# Patient Record
Sex: MALE | Race: WHITE | Employment: OTHER | URBAN - METROPOLITAN AREA
[De-identification: names, ages, dates, MRNs, and addresses within clinical notes are randomized per-mention and may not be internally consistent; named-entity substitution may affect disease eponyms.]

---

## 2019-03-16 ENCOUNTER — HOSPITAL ENCOUNTER (EMERGENCY)
Facility: CLINIC | Age: 74
Discharge: HOME | End: 2019-03-16
Attending: EMERGENCY MEDICINE | Admitting: EMERGENCY MEDICINE
Payer: MEDICARE

## 2019-03-16 ENCOUNTER — NURSE TRIAGE (OUTPATIENT)
Dept: NURSING | Facility: CLINIC | Age: 74
End: 2019-03-16

## 2019-03-16 VITALS
RESPIRATION RATE: 16 BRPM | OXYGEN SATURATION: 96 % | HEART RATE: 100 BPM | HEIGHT: 73 IN | WEIGHT: 212 LBS | DIASTOLIC BLOOD PRESSURE: 103 MMHG | SYSTOLIC BLOOD PRESSURE: 144 MMHG | BODY MASS INDEX: 28.1 KG/M2 | TEMPERATURE: 98.2 F

## 2019-03-16 DIAGNOSIS — N41.1 CHRONIC PROSTATITIS: ICD-10-CM

## 2019-03-16 LAB
ALBUMIN SERPL-MCNC: 3.9 G/DL (ref 3.4–5)
ALP SERPL-CCNC: 62 U/L (ref 40–150)
ALT SERPL W P-5'-P-CCNC: 43 U/L (ref 0–70)
ANION GAP SERPL CALCULATED.3IONS-SCNC: 6 MMOL/L (ref 3–14)
AST SERPL W P-5'-P-CCNC: 30 U/L (ref 0–45)
BASOPHILS # BLD AUTO: 0 10E9/L (ref 0–0.2)
BASOPHILS NFR BLD AUTO: 0.3 %
BILIRUB SERPL-MCNC: 0.7 MG/DL (ref 0.2–1.3)
BUN SERPL-MCNC: 17 MG/DL (ref 7–30)
CALCIUM SERPL-MCNC: 8.6 MG/DL (ref 8.5–10.1)
CHLORIDE SERPL-SCNC: 110 MMOL/L (ref 94–109)
CO2 SERPL-SCNC: 26 MMOL/L (ref 20–32)
CREAT SERPL-MCNC: 1.13 MG/DL (ref 0.66–1.25)
DIFFERENTIAL METHOD BLD: NORMAL
EOSINOPHIL # BLD AUTO: 0 10E9/L (ref 0–0.7)
EOSINOPHIL NFR BLD AUTO: 0.5 %
ERYTHROCYTE [DISTWIDTH] IN BLOOD BY AUTOMATED COUNT: 14.1 % (ref 10–15)
GFR SERPL CREATININE-BSD FRML MDRD: 63 ML/MIN/{1.73_M2}
GLUCOSE SERPL-MCNC: 105 MG/DL (ref 70–99)
HCT VFR BLD AUTO: 42.8 % (ref 40–53)
HGB BLD-MCNC: 14.9 G/DL (ref 13.3–17.7)
IMM GRANULOCYTES # BLD: 0 10E9/L (ref 0–0.4)
IMM GRANULOCYTES NFR BLD: 0.3 %
LYMPHOCYTES # BLD AUTO: 1.2 10E9/L (ref 0.8–5.3)
LYMPHOCYTES NFR BLD AUTO: 19.2 %
MCH RBC QN AUTO: 31.8 PG (ref 26.5–33)
MCHC RBC AUTO-ENTMCNC: 34.8 G/DL (ref 31.5–36.5)
MCV RBC AUTO: 91 FL (ref 78–100)
MONOCYTES # BLD AUTO: 0.5 10E9/L (ref 0–1.3)
MONOCYTES NFR BLD AUTO: 7.7 %
NEUTROPHILS # BLD AUTO: 4.5 10E9/L (ref 1.6–8.3)
NEUTROPHILS NFR BLD AUTO: 72 %
PLATELET # BLD AUTO: 162 10E9/L (ref 150–450)
POTASSIUM SERPL-SCNC: 3.8 MMOL/L (ref 3.4–5.3)
PROT SERPL-MCNC: 7.2 G/DL (ref 6.8–8.8)
RBC # BLD AUTO: 4.69 10E12/L (ref 4.4–5.9)
SODIUM SERPL-SCNC: 142 MMOL/L (ref 133–144)
WBC # BLD AUTO: 6.2 10E9/L (ref 4–11)

## 2019-03-16 PROCEDURE — 80053 COMPREHEN METABOLIC PANEL: CPT | Performed by: EMERGENCY MEDICINE

## 2019-03-16 PROCEDURE — 25000132 ZZH RX MED GY IP 250 OP 250 PS 637: Mod: GY | Performed by: EMERGENCY MEDICINE

## 2019-03-16 PROCEDURE — 99283 EMERGENCY DEPT VISIT LOW MDM: CPT

## 2019-03-16 PROCEDURE — A9270 NON-COVERED ITEM OR SERVICE: HCPCS | Mod: GY | Performed by: EMERGENCY MEDICINE

## 2019-03-16 PROCEDURE — 85025 COMPLETE CBC W/AUTO DIFF WBC: CPT | Performed by: EMERGENCY MEDICINE

## 2019-03-16 RX ORDER — PREGABALIN 75 MG/1
75 CAPSULE ORAL 2 TIMES DAILY
Qty: 60 CAPSULE | Refills: 0 | Status: SHIPPED | OUTPATIENT
Start: 2019-03-16 | End: 2020-04-23

## 2019-03-16 RX ORDER — ACETAMINOPHEN 325 MG/1
650 TABLET ORAL ONCE
Status: COMPLETED | OUTPATIENT
Start: 2019-03-16 | End: 2019-03-16

## 2019-03-16 RX ORDER — TAMSULOSIN HYDROCHLORIDE 0.4 MG/1
1 CAPSULE ORAL EVERY 24 HOURS
COMMUNITY
Start: 2007-03-28

## 2019-03-16 RX ORDER — LEVOTHYROXINE SODIUM 100 UG/1
100 TABLET ORAL DAILY
COMMUNITY
Start: 2018-05-02

## 2019-03-16 RX ORDER — SIMVASTATIN 20 MG
20 TABLET ORAL DAILY
Status: ON HOLD | COMMUNITY
End: 2019-04-24

## 2019-03-16 RX ORDER — SULFAMETHOXAZOLE/TRIMETHOPRIM 800-160 MG
1 TABLET ORAL 2 TIMES DAILY
Status: ON HOLD | COMMUNITY
Start: 2019-03-14 | End: 2019-04-24

## 2019-03-16 RX ADMIN — ACETAMINOPHEN 650 MG: 325 TABLET, FILM COATED ORAL at 12:16

## 2019-03-16 ASSESSMENT — ENCOUNTER SYMPTOMS
VOMITING: 0
RECTAL PAIN: 0
FEVER: 0
CONSTIPATION: 0
FLANK PAIN: 1
ABDOMINAL PAIN: 1
DIARRHEA: 0
FREQUENCY: 0
BLOOD IN STOOL: 0
CHILLS: 1
DYSURIA: 0
NAUSEA: 1

## 2019-03-16 ASSESSMENT — MIFFLIN-ST. JEOR: SCORE: 1750.51

## 2019-03-16 NOTE — ED PROVIDER NOTES
History     Chief Complaint:  Abdominal pain     HPI   Adrián Richardson is a 74 year old male with a history of chronic prostatitis and BPH who presents with abdominal pain.  The patient reports ongoing issues with his prostate which initially started in December of last year.  He was treated with a course of Bactrim and then in January was switched over to Cipro after he continued to have symptoms.  His typical symptoms are left-sided lower abdominal pain radiating to his left flank.  He had a CT scan in mid-February of this year which showed a possible small abscess therefore he was switched over to Clindamycin by his urologist, Dr. Whittaker.  His symptoms did improve somewhat but then worsened again.  He saw ID 2 weeks ago who recommended stopping antibiotics as he did not have any evidence of current infection.  Cultures were done and were negative.  In the past 4 - 5 days, his left-sided abdominal pain has worsened and he is having convulsions of pain starting in his prostate area with associated rigors and flushing.  He was seen in the ED at [facility] 2 days ago.  Urinalysis showed trace leukocyte esterase but was otherwise unremarkable.  He was started on Bactrim empirically as this has helped his symptoms in the past.  He continues to have pain, prompting his presentation here today.  He denies any fevers, pain with urination, frequency, urgency, or hesitancy.  He has no rectal pain and is having normal bowel movements.  He did have some nausea last night but he denies any vomiting or diarrhea.  He has taken a total of 5 Advil, in divided doses, since 0000 today.  He has an appointment to see Dr. Whittaker in 3 days time.      Allergies:  Levofloxacin      Medications:    Levothyroxine   Bactrim  Tamsulosin  Simvastatin     Past Medical History:    Benign prostatic hypertrophy   Gastroesophageal reflux disease   Hiatal hernia   Diverticulosis  Psoriasis   Hyperlipidemia  Caroline-Parkinson-White syndrome  "  Degenerative disc disease   Hypothyroidism     Past Surgical History:    Nasal sinus surgery, left 2012  Inguinal hernia repair, bilateral 2003  Knee arthroscopy, left   Hand carpectomy, right   Cataract removal    Family History:    Breast cancer - mother  Colon cancer - father  Myocardial infarction - brother     Social History:  Presents to the ED alone.  Tobacco Use: No previous or current tobacco use.   Alcohol Use: Occasional alcohol use.   PCP: [facility]     Review of Systems   Constitutional: Positive for chills. Negative for fever.   Gastrointestinal: Positive for abdominal pain and nausea. Negative for blood in stool, constipation, diarrhea, rectal pain and vomiting.   Genitourinary: Positive for flank pain. Negative for dysuria, frequency and urgency.   All other systems reviewed and are negative.      Physical Exam   First Vitals:  BP: 134/84  Pulse: 100  Heart Rate: 100  Temp: 98.2  F (36.8  C)  Resp: 16  Height: 185.4 cm (6' 1\")  Weight: 96.2 kg (212 lb)  SpO2: 96 %    Physical Exam  General: Well-nourished, appears to be resting comfortably when I enter the room  Eyes: PERRL, conjunctivae pink no scleral icterus or conjunctival injection  ENT:  Moist mucus membranes, posterior oropharynx clear without erythema or exudates  Respiratory:  Lungs clear to auscultation bilaterally, no crackles/rubs/wheezes.  Good air movement  CV: Normal rate and rhythm, no murmurs/rubs/gallops  GI:  Abdomen soft and non-distended.  Normoactive BS.  No tenderness, guarding or rebound.  No palpable hernia.  Skin: Warm, dry.  No rashes or petechiae  Musculoskeletal: No peripheral edema or calf tenderness  Neuro: Alert and oriented to person/place/time  Psychiatric: Tearful affect    Emergency Department Course     Laboratory:  CBC: WNL (WBC 6.2, HGB 14.9, )   CMP: Cl 110 (H), Glucose 105 (H), otherwise WNL (Creatinine 1.13)     Interventions:  (1216) Tylenol, 650 mg, PO     Emergency Department Course:  Blood " was drawn from the patient in triage. This was sent for laboratory testing, findings above.      Nursing notes and vitals reviewed.  I performed an exam of the patient as documented above.     (9573) I spoke with Dr. Rboledo, on call for the patient's primary urologist.      Findings and plan explained to the patient. Patient discharged home with instructions regarding supportive care, medications, and reasons to return. The importance of close follow-up was reviewed. The patient was prescribed Pregabalin.     Impression & Plan      Medical Decision Making:  Adrián Richardson is a 74 year old male who comes on his birthday today for concern of a flare of severe prostate pain.  He has had a difficult course of prostatitis over the past several months that has intermittently responded to antibiotics but has been fairly regular.  He was recently started on Bactrim after a negative urinalysis.  He has an appointment coming up with Dr. Whittakre, his urologist.  I reviewed the extensive paperwork he brought with him detailing his symptoms.  It seems that there may be some relation to hernia but I do not appreciate any tenderness or incarcerated hernia today.  He is not febrile and attributes the shaking that he had to severity of his pain.  He has an otherwise benign examination and  reassuring vital signs.  He has recently been evaluated.  He had extensive CT scans for the same problem.  I really do not feel that he would benefit from further laboratory or imaging testing today.  I discussed this with him at length.  I called the on-call urologist for advice and he felt that if the patient was vitally stable he could follow-up as an outpatient on Wednesday with Dr. Whittaker.  I did some research and it appears that for chronic prostatitis and pelvic pain there are some additional options.  I recommended that we try pregabalin  to see if this would provide some assistance.  There are also some herbal supplements which have  been shown to have some potential benefit.  I discussed this with him.  I also discussed with him the nature of chronic pain.  He does not want to be started on opiate medications.  I recommended to him that we have for refer him to a pain clinic to see what other modalities might be available to him and might benefit him.  He was open to this plan.  He does not seem to have underlying depression but this is certainly causing a great deal of strain for him.  Today is his birthday and he was unable to meet with his family and care for his grandson.  Is having a great impact on his life and I am hopeful that he can get some improvement in his symptoms with a coordinated approach from the pain clinic.  A referral was made in epic for him.  I also updated our care coordinator via a staff message and asked if she could assist him in obtaining an appointment that works with his insurance.  The patient was in agreement with this plan.  He is going to follow-up as directed and return if any worsening.    Diagnosis:    ICD-10-CM    1. Chronic prostatitis N41.1 PAIN MANAGEMENT REFERRAL     Disposition:  Discharged to home.     Discharge Medications:     Medication List      Started    pregabalin 75 MG capsule  Commonly known as:  LYRICA  75 mg, Oral, 2 TIMES DAILY            I, Eva Dean, am serving as a scribe on 3/16/2019 at 11:13 AM to personally document services performed by Dr. Pack based on my observations and the provider's statements to me.    Eva Dean  3/16/2019    EMERGENCY DEPARTMENT       Anjana Pack MD  03/17/19 2524

## 2019-03-16 NOTE — TELEPHONE ENCOUNTER
"Patient states he has prostate problems, has been diagnosed with prostate flare.  \"Extreme\" pain stated las evening at 7PM and since midnight, patient has taken 5 Advil with little to no relief.  States pain started in prostate and moved to abdomen.  Started Bactrim yesterday.  Denies fever.  Experiencing sweats, \"abdominal convulsions\", nausea.  Has appointment with Dr. Whittaker on 3/19/19.  Advised patient to be seen at  or ED per main clinic line out of office recording.  Patient will go to [facility].    Reason for Disposition    [1] SEVERE pain AND [2] age > 60    Additional Information    Negative: Shock suspected (e.g., cold/pale/clammy skin, too weak to stand, low BP, rapid pulse)    Negative: Difficult to awaken or acting confused  (e.g., disoriented, slurred speech)    Negative: Passed out (i.e., lost consciousness, collapsed and was not responding)    Negative: Sounds like a life-threatening emergency to the triager    Negative: Chest pain    Negative: Pain is mainly in upper abdomen  (if needed ask: \"is it mainly above the belly button?\")    Negative: Followed an abdomen (stomach) injury    Negative: [1] SEVERE pain (e.g., excruciating) AND [2] present > 1 hour    Protocols used: ABDOMINAL PAIN - MALE-ADULT-      "

## 2019-03-16 NOTE — DISCHARGE INSTRUCTIONS
*You may resume diet and activities as tolerated.  *Take medications as prescribed.  You can take ibuprofen 600 mg by mouth up to 4 times daily.  You can also take tylenol 650mg by mouth 4 times daily.  Pregabalin as directed.  You can also try cerniton and quercetin which you might find at a vitamin/supplement store.  Continue your current medications.  *Follow-up with Dr. Whittaker as scheduled on Tuesday.  Recommend establishing care with a pain clinic.  I have made a referral to our care coordinator to help assist with this.   *Return if you develop fever over 100.4, faint or feel like you will faint or become worse in any way.

## 2019-03-16 NOTE — ED AVS SNAPSHOT
Emergency Department  [address] [place] [id number]  Phone:  [phone]  Fax:  [phone]                                    Adrián Richardson   MRN: [id number]    Department:   Emergency Department   Date of Visit:  3/16/2019           After Visit Summary Signature Page    I have received my discharge instructions, and my questions have been answered. I have discussed any challenges I see with this plan with the nurse or doctor.    ..........................................................................................................................................  Patient/Patient Representative Signature      ..........................................................................................................................................  Patient Representative Print Name and Relationship to Patient    ..................................................               ................................................  Date                                   Time    ..........................................................................................................................................  Reviewed by Signature/Title    ...................................................              ..............................................  Date                                               Time          22EPIC Rev 08/18

## 2019-04-23 RX ORDER — BETAMETHASONE DIPROPIONATE 0.5 MG/G
CREAM TOPICAL 2 TIMES DAILY
COMMUNITY

## 2019-04-23 RX ORDER — FLUOCINONIDE TOPICAL SOLUTION USP, 0.05% 0.5 MG/ML
SOLUTION TOPICAL
COMMUNITY

## 2019-04-23 RX ORDER — ATORVASTATIN CALCIUM 40 MG/1
40 TABLET, FILM COATED ORAL DAILY
COMMUNITY

## 2019-04-23 RX ORDER — FLUTICASONE PROPIONATE 50 MCG
1 SPRAY, SUSPENSION (ML) NASAL DAILY
COMMUNITY

## 2019-04-23 RX ORDER — DUTASTERIDE 0.5 MG/1
0.5 CAPSULE, LIQUID FILLED ORAL DAILY
COMMUNITY

## 2019-04-23 RX ORDER — HYDROCORTISONE VALERATE CREAM 2 MG/G
CREAM TOPICAL 2 TIMES DAILY
COMMUNITY

## 2019-04-23 RX ORDER — CHLORAL HYDRATE 500 MG
1 CAPSULE ORAL DAILY
COMMUNITY

## 2019-04-23 RX ORDER — CLOBETASOL PROPIONATE 0.5 MG/G
CREAM TOPICAL 2 TIMES DAILY
COMMUNITY

## 2019-04-23 RX ORDER — CHOLECALCIFEROL (VITAMIN D3) 50 MCG
1 TABLET ORAL DAILY
COMMUNITY

## 2019-04-24 ENCOUNTER — HOSPITAL ENCOUNTER (OUTPATIENT)
Facility: CLINIC | Age: 74
Discharge: HOME | End: 2019-04-25
Attending: UROLOGY | Admitting: UROLOGY
Payer: MEDICARE

## 2019-04-24 ENCOUNTER — ANESTHESIA (OUTPATIENT)
Dept: SURGERY | Facility: CLINIC | Age: 74
End: 2019-04-24
Payer: MEDICARE

## 2019-04-24 ENCOUNTER — ANESTHESIA EVENT (OUTPATIENT)
Dept: SURGERY | Facility: CLINIC | Age: 74
End: 2019-04-24
Payer: MEDICARE

## 2019-04-24 DIAGNOSIS — Z98.890 POST-OPERATIVE STATE: Primary | ICD-10-CM

## 2019-04-24 PROCEDURE — 25800030 ZZH RX IP 258 OP 636: Performed by: UROLOGY

## 2019-04-24 PROCEDURE — 25000128 H RX IP 250 OP 636: Performed by: ANESTHESIOLOGY

## 2019-04-24 PROCEDURE — 25800030 ZZH RX IP 258 OP 636: Performed by: NURSE ANESTHETIST, CERTIFIED REGISTERED

## 2019-04-24 PROCEDURE — 88305 TISSUE EXAM BY PATHOLOGIST: CPT | Performed by: UROLOGY

## 2019-04-24 PROCEDURE — 37000008 ZZH ANESTHESIA TECHNICAL FEE, 1ST 30 MIN: Performed by: UROLOGY

## 2019-04-24 PROCEDURE — A9270 NON-COVERED ITEM OR SERVICE: HCPCS | Performed by: UROLOGY

## 2019-04-24 PROCEDURE — 25000125 ZZHC RX 250: Performed by: UROLOGY

## 2019-04-24 PROCEDURE — A9270 NON-COVERED ITEM OR SERVICE: HCPCS | Mod: GY | Performed by: UROLOGY

## 2019-04-24 PROCEDURE — 36000058 ZZH SURGERY LEVEL 3 EA 15 ADDTL MIN: Performed by: UROLOGY

## 2019-04-24 PROCEDURE — 71000012 ZZH RECOVERY PHASE 1 LEVEL 1 FIRST HR: Performed by: UROLOGY

## 2019-04-24 PROCEDURE — 25000132 ZZH RX MED GY IP 250 OP 250 PS 637: Mod: GY | Performed by: UROLOGY

## 2019-04-24 PROCEDURE — 25000128 H RX IP 250 OP 636: Performed by: NURSE ANESTHETIST, CERTIFIED REGISTERED

## 2019-04-24 PROCEDURE — 25000566 ZZH SEVOFLURANE, EA 15 MIN: Performed by: UROLOGY

## 2019-04-24 PROCEDURE — 36000056 ZZH SURGERY LEVEL 3 1ST 30 MIN: Performed by: UROLOGY

## 2019-04-24 PROCEDURE — 25000128 H RX IP 250 OP 636: Performed by: UROLOGY

## 2019-04-24 PROCEDURE — 25800025 ZZH RX 258: Performed by: UROLOGY

## 2019-04-24 PROCEDURE — 27210794 ZZH OR GENERAL SUPPLY STERILE: Performed by: UROLOGY

## 2019-04-24 PROCEDURE — 37000009 ZZH ANESTHESIA TECHNICAL FEE, EACH ADDTL 15 MIN: Performed by: UROLOGY

## 2019-04-24 PROCEDURE — 40000170 ZZH STATISTIC PRE-PROCEDURE ASSESSMENT II: Performed by: UROLOGY

## 2019-04-24 PROCEDURE — 88305 TISSUE EXAM BY PATHOLOGIST: CPT | Mod: 26 | Performed by: UROLOGY

## 2019-04-24 PROCEDURE — 25800030 ZZH RX IP 258 OP 636: Performed by: ANESTHESIOLOGY

## 2019-04-24 PROCEDURE — 25000125 ZZHC RX 250: Performed by: NURSE ANESTHETIST, CERTIFIED REGISTERED

## 2019-04-24 RX ORDER — SODIUM CHLORIDE, SODIUM LACTATE, POTASSIUM CHLORIDE, CALCIUM CHLORIDE 600; 310; 30; 20 MG/100ML; MG/100ML; MG/100ML; MG/100ML
INJECTION, SOLUTION INTRAVENOUS CONTINUOUS
Status: DISCONTINUED | OUTPATIENT
Start: 2019-04-24 | End: 2019-04-24

## 2019-04-24 RX ORDER — LEVOTHYROXINE SODIUM 100 UG/1
100 TABLET ORAL DAILY
Status: DISCONTINUED | OUTPATIENT
Start: 2019-04-24 | End: 2019-04-25 | Stop reason: HOSPADM

## 2019-04-24 RX ORDER — FENTANYL CITRATE 50 UG/ML
25-50 INJECTION, SOLUTION INTRAMUSCULAR; INTRAVENOUS EVERY 5 MIN PRN
Status: DISCONTINUED | OUTPATIENT
Start: 2019-04-24 | End: 2019-04-24 | Stop reason: ALTCHOICE

## 2019-04-24 RX ORDER — ATORVASTATIN CALCIUM 40 MG/1
40 TABLET, FILM COATED ORAL DAILY
Status: DISCONTINUED | OUTPATIENT
Start: 2019-04-24 | End: 2019-04-25 | Stop reason: HOSPADM

## 2019-04-24 RX ORDER — ATROPA BELLADONNA AND OPIUM 16.2; 3 MG/1; MG/1
SUPPOSITORY RECTAL PRN
Status: DISCONTINUED | OUTPATIENT
Start: 2019-04-24 | End: 2019-04-24 | Stop reason: HOSPADM

## 2019-04-24 RX ORDER — ONDANSETRON 2 MG/ML
4 INJECTION INTRAMUSCULAR; INTRAVENOUS EVERY 6 HOURS PRN
Status: DISCONTINUED | OUTPATIENT
Start: 2019-04-24 | End: 2019-04-25 | Stop reason: HOSPADM

## 2019-04-24 RX ORDER — PREGABALIN 75 MG/1
75 CAPSULE ORAL 2 TIMES DAILY
Status: DISCONTINUED | OUTPATIENT
Start: 2019-04-24 | End: 2019-04-25 | Stop reason: HOSPADM

## 2019-04-24 RX ORDER — PROPOFOL 10 MG/ML
INJECTION, EMULSION INTRAVENOUS PRN
Status: DISCONTINUED | OUTPATIENT
Start: 2019-04-24 | End: 2019-04-24

## 2019-04-24 RX ORDER — NEOSTIGMINE METHYLSULFATE 1 MG/ML
VIAL (ML) INJECTION PRN
Status: DISCONTINUED | OUTPATIENT
Start: 2019-04-24 | End: 2019-04-24

## 2019-04-24 RX ORDER — ONDANSETRON 2 MG/ML
4 INJECTION INTRAMUSCULAR; INTRAVENOUS EVERY 30 MIN PRN
Status: DISCONTINUED | OUTPATIENT
Start: 2019-04-24 | End: 2019-04-24 | Stop reason: DRUGHIGH

## 2019-04-24 RX ORDER — EPHEDRINE SULFATE 50 MG/ML
INJECTION, SOLUTION INTRAMUSCULAR; INTRAVENOUS; SUBCUTANEOUS PRN
Status: DISCONTINUED | OUTPATIENT
Start: 2019-04-24 | End: 2019-04-24

## 2019-04-24 RX ORDER — NALOXONE HYDROCHLORIDE 0.4 MG/ML
.1-.4 INJECTION, SOLUTION INTRAMUSCULAR; INTRAVENOUS; SUBCUTANEOUS
Status: DISCONTINUED | OUTPATIENT
Start: 2019-04-24 | End: 2019-04-24

## 2019-04-24 RX ORDER — DUTASTERIDE 0.5 MG/1
0.5 CAPSULE, LIQUID FILLED ORAL DAILY
Status: DISCONTINUED | OUTPATIENT
Start: 2019-04-24 | End: 2019-04-25 | Stop reason: HOSPADM

## 2019-04-24 RX ORDER — HYDROMORPHONE HYDROCHLORIDE 1 MG/ML
.3-.5 INJECTION, SOLUTION INTRAMUSCULAR; INTRAVENOUS; SUBCUTANEOUS EVERY 10 MIN PRN
Status: DISCONTINUED | OUTPATIENT
Start: 2019-04-24 | End: 2019-04-24 | Stop reason: ALTCHOICE

## 2019-04-24 RX ORDER — CEFAZOLIN SODIUM 2 G/100ML
2 INJECTION, SOLUTION INTRAVENOUS
Status: COMPLETED | OUTPATIENT
Start: 2019-04-24 | End: 2019-04-24

## 2019-04-24 RX ORDER — HYDROMORPHONE HYDROCHLORIDE 1 MG/ML
0.2 INJECTION, SOLUTION INTRAMUSCULAR; INTRAVENOUS; SUBCUTANEOUS
Status: DISCONTINUED | OUTPATIENT
Start: 2019-04-24 | End: 2019-04-25 | Stop reason: HOSPADM

## 2019-04-24 RX ORDER — SODIUM CHLORIDE, SODIUM LACTATE, POTASSIUM CHLORIDE, CALCIUM CHLORIDE 600; 310; 30; 20 MG/100ML; MG/100ML; MG/100ML; MG/100ML
INJECTION, SOLUTION INTRAVENOUS CONTINUOUS PRN
Status: DISCONTINUED | OUTPATIENT
Start: 2019-04-24 | End: 2019-04-24

## 2019-04-24 RX ORDER — OXYCODONE HYDROCHLORIDE 5 MG/1
5-10 TABLET ORAL
Status: DISCONTINUED | OUTPATIENT
Start: 2019-04-24 | End: 2019-04-25 | Stop reason: HOSPADM

## 2019-04-24 RX ORDER — ONDANSETRON 2 MG/ML
INJECTION INTRAMUSCULAR; INTRAVENOUS PRN
Status: DISCONTINUED | OUTPATIENT
Start: 2019-04-24 | End: 2019-04-24

## 2019-04-24 RX ORDER — OXYCODONE HYDROCHLORIDE 5 MG/1
5 TABLET ORAL
Status: DISCONTINUED | OUTPATIENT
Start: 2019-04-24 | End: 2019-04-24 | Stop reason: ALTCHOICE

## 2019-04-24 RX ORDER — FENTANYL CITRATE 50 UG/ML
INJECTION, SOLUTION INTRAMUSCULAR; INTRAVENOUS PRN
Status: DISCONTINUED | OUTPATIENT
Start: 2019-04-24 | End: 2019-04-24

## 2019-04-24 RX ORDER — SODIUM CHLORIDE, SODIUM LACTATE, POTASSIUM CHLORIDE, CALCIUM CHLORIDE 600; 310; 30; 20 MG/100ML; MG/100ML; MG/100ML; MG/100ML
INJECTION, SOLUTION INTRAVENOUS CONTINUOUS
Status: DISCONTINUED | OUTPATIENT
Start: 2019-04-24 | End: 2019-04-25

## 2019-04-24 RX ORDER — GLYCINE 1.5 G/100ML
IRRIGANT IRRIGATION PRN
Status: DISCONTINUED | OUTPATIENT
Start: 2019-04-24 | End: 2019-04-24 | Stop reason: HOSPADM

## 2019-04-24 RX ORDER — GLYCOPYRROLATE 0.2 MG/ML
INJECTION, SOLUTION INTRAMUSCULAR; INTRAVENOUS PRN
Status: DISCONTINUED | OUTPATIENT
Start: 2019-04-24 | End: 2019-04-24

## 2019-04-24 RX ORDER — TAMSULOSIN HYDROCHLORIDE 0.4 MG/1
0.4 CAPSULE ORAL EVERY 24 HOURS
Status: DISCONTINUED | OUTPATIENT
Start: 2019-04-24 | End: 2019-04-25 | Stop reason: HOSPADM

## 2019-04-24 RX ORDER — LIDOCAINE HYDROCHLORIDE 20 MG/ML
INJECTION, SOLUTION INFILTRATION; PERINEURAL PRN
Status: DISCONTINUED | OUTPATIENT
Start: 2019-04-24 | End: 2019-04-24

## 2019-04-24 RX ORDER — ONDANSETRON 4 MG/1
4 TABLET, ORALLY DISINTEGRATING ORAL EVERY 6 HOURS PRN
Status: DISCONTINUED | OUTPATIENT
Start: 2019-04-24 | End: 2019-04-25 | Stop reason: HOSPADM

## 2019-04-24 RX ORDER — ONDANSETRON 4 MG/1
4 TABLET, ORALLY DISINTEGRATING ORAL EVERY 30 MIN PRN
Status: DISCONTINUED | OUTPATIENT
Start: 2019-04-24 | End: 2019-04-24 | Stop reason: DRUGHIGH

## 2019-04-24 RX ORDER — ATROPA BELLADONNA AND OPIUM 16.2; 3 MG/1; MG/1
30 SUPPOSITORY RECTAL 3 TIMES DAILY PRN
Status: DISCONTINUED | OUTPATIENT
Start: 2019-04-24 | End: 2019-04-25 | Stop reason: HOSPADM

## 2019-04-24 RX ORDER — VECURONIUM BROMIDE 1 MG/ML
INJECTION, POWDER, LYOPHILIZED, FOR SOLUTION INTRAVENOUS PRN
Status: DISCONTINUED | OUTPATIENT
Start: 2019-04-24 | End: 2019-04-24

## 2019-04-24 RX ORDER — LIDOCAINE 40 MG/G
CREAM TOPICAL
Status: DISCONTINUED | OUTPATIENT
Start: 2019-04-24 | End: 2019-04-25 | Stop reason: HOSPADM

## 2019-04-24 RX ORDER — CEFAZOLIN SODIUM 1 G/3ML
1 INJECTION, POWDER, FOR SOLUTION INTRAMUSCULAR; INTRAVENOUS EVERY 8 HOURS
Status: COMPLETED | OUTPATIENT
Start: 2019-04-24 | End: 2019-04-24

## 2019-04-24 RX ORDER — DEXAMETHASONE SODIUM PHOSPHATE 4 MG/ML
INJECTION, SOLUTION INTRA-ARTICULAR; INTRALESIONAL; INTRAMUSCULAR; INTRAVENOUS; SOFT TISSUE PRN
Status: DISCONTINUED | OUTPATIENT
Start: 2019-04-24 | End: 2019-04-24

## 2019-04-24 RX ORDER — NALOXONE HYDROCHLORIDE 0.4 MG/ML
.1-.4 INJECTION, SOLUTION INTRAMUSCULAR; INTRAVENOUS; SUBCUTANEOUS
Status: DISCONTINUED | OUTPATIENT
Start: 2019-04-24 | End: 2019-04-25 | Stop reason: HOSPADM

## 2019-04-24 RX ADMIN — CEFAZOLIN SODIUM 2 G: 2 INJECTION, SOLUTION INTRAVENOUS at 07:39

## 2019-04-24 RX ADMIN — SODIUM CHLORIDE, POTASSIUM CHLORIDE, SODIUM LACTATE AND CALCIUM CHLORIDE: 600; 310; 30; 20 INJECTION, SOLUTION INTRAVENOUS at 17:55

## 2019-04-24 RX ADMIN — ONDANSETRON 4 MG: 2 INJECTION INTRAMUSCULAR; INTRAVENOUS at 08:37

## 2019-04-24 RX ADMIN — HYDROMORPHONE HYDROCHLORIDE 0.5 MG: 1 INJECTION, SOLUTION INTRAMUSCULAR; INTRAVENOUS; SUBCUTANEOUS at 09:19

## 2019-04-24 RX ADMIN — SODIUM CHLORIDE, POTASSIUM CHLORIDE, SODIUM LACTATE AND CALCIUM CHLORIDE: 600; 310; 30; 20 INJECTION, SOLUTION INTRAVENOUS at 08:33

## 2019-04-24 RX ADMIN — DEXMEDETOMIDINE HYDROCHLORIDE 8 MCG: 100 INJECTION, SOLUTION INTRAVENOUS at 08:17

## 2019-04-24 RX ADMIN — CEFAZOLIN 1 G: 1 INJECTION, POWDER, FOR SOLUTION INTRAMUSCULAR; INTRAVENOUS at 22:25

## 2019-04-24 RX ADMIN — LIDOCAINE HYDROCHLORIDE 80 MG: 20 INJECTION, SOLUTION INFILTRATION; PERINEURAL at 07:32

## 2019-04-24 RX ADMIN — FENTANYL CITRATE 50 MCG: 50 INJECTION, SOLUTION INTRAMUSCULAR; INTRAVENOUS at 07:45

## 2019-04-24 RX ADMIN — FENTANYL CITRATE 50 MCG: 50 INJECTION, SOLUTION INTRAMUSCULAR; INTRAVENOUS at 07:32

## 2019-04-24 RX ADMIN — CEFAZOLIN 1 G: 1 INJECTION, POWDER, FOR SOLUTION INTRAMUSCULAR; INTRAVENOUS at 15:03

## 2019-04-24 RX ADMIN — PROPOFOL 30 MG: 10 INJECTION, EMULSION INTRAVENOUS at 08:04

## 2019-04-24 RX ADMIN — SODIUM CHLORIDE, POTASSIUM CHLORIDE, SODIUM LACTATE AND CALCIUM CHLORIDE: 600; 310; 30; 20 INJECTION, SOLUTION INTRAVENOUS at 09:20

## 2019-04-24 RX ADMIN — NEOSTIGMINE METHYLSULFATE 5 MG: 1 INJECTION, SOLUTION INTRAVENOUS at 08:37

## 2019-04-24 RX ADMIN — GLYCOPYRROLATE 0.8 MG: 0.2 INJECTION, SOLUTION INTRAMUSCULAR; INTRAVENOUS at 08:37

## 2019-04-24 RX ADMIN — MIDAZOLAM 1 MG: 1 INJECTION INTRAMUSCULAR; INTRAVENOUS at 07:27

## 2019-04-24 RX ADMIN — VECURONIUM BROMIDE 1 MG: 1 INJECTION, POWDER, LYOPHILIZED, FOR SOLUTION INTRAVENOUS at 08:28

## 2019-04-24 RX ADMIN — TAMSULOSIN HYDROCHLORIDE 0.4 MG: 0.4 CAPSULE ORAL at 17:56

## 2019-04-24 RX ADMIN — PROPOFOL 150 MG: 10 INJECTION, EMULSION INTRAVENOUS at 07:32

## 2019-04-24 RX ADMIN — Medication 5 MG: at 08:34

## 2019-04-24 RX ADMIN — SODIUM CHLORIDE 6000 ML: 900 IRRIGANT IRRIGATION at 15:07

## 2019-04-24 RX ADMIN — SODIUM CHLORIDE 6000 ML: 900 IRRIGANT IRRIGATION at 19:19

## 2019-04-24 RX ADMIN — DUTASTERIDE 0.5 MG: 0.5 CAPSULE ORAL at 17:56

## 2019-04-24 RX ADMIN — SODIUM CHLORIDE, POTASSIUM CHLORIDE, SODIUM LACTATE AND CALCIUM CHLORIDE: 600; 310; 30; 20 INJECTION, SOLUTION INTRAVENOUS at 07:27

## 2019-04-24 RX ADMIN — ATORVASTATIN CALCIUM 40 MG: 40 TABLET, FILM COATED ORAL at 17:56

## 2019-04-24 RX ADMIN — ROCURONIUM BROMIDE 10 MG: 10 INJECTION INTRAVENOUS at 08:17

## 2019-04-24 RX ADMIN — ROCURONIUM BROMIDE 40 MG: 10 INJECTION INTRAVENOUS at 07:32

## 2019-04-24 RX ADMIN — OXYCODONE HYDROCHLORIDE 5 MG: 5 TABLET ORAL at 20:52

## 2019-04-24 RX ADMIN — DEXAMETHASONE SODIUM PHOSPHATE 4 MG: 4 INJECTION, SOLUTION INTRA-ARTICULAR; INTRALESIONAL; INTRAMUSCULAR; INTRAVENOUS; SOFT TISSUE at 07:38

## 2019-04-24 RX ADMIN — DEXMEDETOMIDINE HYDROCHLORIDE 8 MCG: 100 INJECTION, SOLUTION INTRAVENOUS at 08:04

## 2019-04-24 RX ADMIN — PHENYLEPHRINE HYDROCHLORIDE 50 MCG: 10 INJECTION, SOLUTION INTRAMUSCULAR; INTRAVENOUS; SUBCUTANEOUS at 08:34

## 2019-04-24 RX ADMIN — SODIUM CHLORIDE, POTASSIUM CHLORIDE, SODIUM LACTATE AND CALCIUM CHLORIDE: 600; 310; 30; 20 INJECTION, SOLUTION INTRAVENOUS at 10:30

## 2019-04-24 RX ADMIN — PROPOFOL 20 MG: 10 INJECTION, EMULSION INTRAVENOUS at 08:17

## 2019-04-24 RX ADMIN — PREGABALIN 75 MG: 75 CAPSULE ORAL at 20:10

## 2019-04-24 ASSESSMENT — COPD QUESTIONNAIRES: COPD: 0

## 2019-04-24 ASSESSMENT — MIFFLIN-ST. JEOR: SCORE: 1765.47

## 2019-04-24 ASSESSMENT — LIFESTYLE VARIABLES: TOBACCO_USE: 0

## 2019-04-24 NOTE — PLAN OF CARE
Tolerating fulls. Irrigation at moderate rate. Up with SBA. 2L oxygen, working on weaning. Capno refused. C/o discomfort but refuses interventions.

## 2019-04-24 NOTE — ANESTHESIA CARE TRANSFER NOTE
Patient: Adrián Richardson    Procedure(s):  CYSTOSCOPY, TRANSURETHRAL RESECTION (TUR) PROSTATE    Diagnosis: BENIGN PROSTATIC HYPERPLASIA  Diagnosis Additional Information: No value filed.    Anesthesia Type:   General, ETT     Note:  Airway :Face Mask  Patient transferred to:PACU  Comments: VSS. Airway and IV patent. Patient comfortable. Report to RN. Stable care transfer.  Handoff Report: Identifed the Patient, Identified the Reponsible Provider, Reviewed the pertinent medical history, Discussed the surgical course, Reviewed Intra-OP anesthesia mangement and issues during anesthesia, Set expectations for post-procedure period and Allowed opportunity for questions and acknowledgement of understanding      Vitals: (Last set prior to Anesthesia Care Transfer)    CRNA VITALS  4/24/2019 0815 - 4/24/2019 0851      4/24/2019             Pulse:  87    SpO2:  94 %    Resp Rate (set):  10                Electronically Signed By: BELKIS Donis CRNA  April 24, 2019  8:51 AM

## 2019-04-24 NOTE — ANESTHESIA POSTPROCEDURE EVALUATION
Patient: Adrián Richardson    Procedure(s):  CYSTOSCOPY, TRANSURETHRAL RESECTION (TUR) PROSTATE    Diagnosis:BENIGN PROSTATIC HYPERPLASIA  Diagnosis Additional Information: No value filed.    Anesthesia Type:  General, ETT    Note:  Anesthesia Post Evaluation    Patient location during evaluation: PACU  Patient participation: Able to fully participate in evaluation  Level of consciousness: awake and alert  Pain management: adequate  Airway patency: patent  Cardiovascular status: acceptable  Respiratory status: acceptable  Hydration status: acceptable  PONV: none     Anesthetic complications: None          Last vitals:  Vitals:    04/24/19 0945 04/24/19 0949 04/24/19 1020   BP:   120/71   Pulse:      Resp: 11  16   Temp: 36.2  C (97.2  F)  36.1  C (97  F)   SpO2: 94% 94%          Electronically Signed By: Yury Pressley MD  April 24, 2019  12:41 PM

## 2019-04-24 NOTE — PROGRESS NOTES
Patient brought loose levothyroxine 100 mcg and lyrica 75 mg      Admission medication history interview status for the 4/24/2019  admission is complete. See EPIC admission navigator for prior to admission medications     Medication history source reliability:Good    Medication history interview source(s):Patient    Medication history resources (including written lists, pill bottles, clinic record):None    [email or web address]    Additional medication history information not noted: none    Time spent in this activity: 40 minutes    Prior to Admission medications    Medication Sig Last Dose Taking? Auth Provider   ascorbic acid (VITAMIN C) 500 MG CPCR CR capsule Take 500 mg by mouth daily 4/14/2019 at 0700 Yes Reported, Patient   atorvastatin (LIPITOR) 40 MG tablet Take 40 mg by mouth daily 4/23/2019 at 1830 Yes Reported, Patient   clobetasol (TEMOVATE) 0.05 % external cream Apply topically 2 times daily 4/24/2019 at 0430 Yes Reported, Patient   Cobalamine Combinations (B12 FOLATE PO) Take 1 tablet by mouth daily 4/18/2019 at 0800 Yes Reported, Patient   coenzyme Q-10 10 MG CAPS Take 1 each by mouth every 24 hours 4/18/2019 at 0800 Yes Reported, Patient   dutasteride (AVODART) 0.5 MG capsule Take 0.5 mg by mouth daily 4/23/2019 at 1830 Yes Reported, Patient   fish oil-omega-3 fatty acids 1000 MG capsule Take 1 g by mouth daily 4/14/2019 at 0730 Yes Reported, Patient   fluocinonide (LIDEX) 0.05 % external solution Apply topically nightly as needed 4/24/2019 at 0430 Yes Reported, Patient   L-ARGININE PO Take 1 tablet by mouth daily 4/18/2019 at 0800 Yes Reported, Patient   L-LYSINE PO Take 1-2 tablets by mouth daily [id number] at 0800 Yes Reported, Patient   levothyroxine (SYNTHROID/LEVOTHROID) 100 MCG tablet Take 100 mcg by mouth daily 4/23/2019 at 0500 Yes Reported, Patient   pregabalin (LYRICA) 75 MG capsule Take 1 capsule (75 mg) by mouth 2 times daily 4/23/2019 at 1830 Yes Anjana Pack MD   Probiotic Product  (PROBIOTIC COMPLEX ACIDOPHILUS PO) Take 1 capsule by mouth daily 04/18/2019 at 0800 Yes Reported, Patient   tamsulosin (FLOMAX) 0.4 MG capsule Take 1 capsule by mouth every 24 hours 4/23/2019 at 1830 Yes Reported, Patient   vitamin D3 (CHOLECALCIFEROL) 2000 units tablet Take 1 tablet by mouth daily 4/18/2019 at 0800 Yes Reported, Patient   betamethasone dipropionate (DIPROSONE) 0.05 % external cream Apply topically 2 times daily Unknown at prn  Reported, Patient   fluticasone (FLONASE) 50 MCG/ACT nasal spray Spray 1 spray into both nostrils daily Unknown at prn  Reported, Patient   hydrocortisone (WESTCORT) 0.2 % external cream Apply topically 2 times daily Unknown at prn  Reported, Patient   metroNIDAZOLE (METROCREAM) 0.75 % external cream Apply topically 2 times daily as needed Unknown at prn  Reported, Patient

## 2019-04-24 NOTE — PLAN OF CARE
A&Ox4. VSS. RA. Lungs clear. Bowel sounds active. Continous bladder irrigation, peach colored output. Tolerating regular diet. C/O mild discomfort with justice. SBA

## 2019-04-24 NOTE — OP NOTE
"Procedure Date: 04/24/2019      PROCEDURE:  Conventional transurethral resection of the prostate.      PREOPERATIVE DIAGNOSES:  Benign prostatic hypertrophy with obstruction, chronic prostatitis and prostatic abscess.      POSTOPERATIVE DIAGNOSES:  Benign prostatic hypertrophy with obstruction, chronic prostatitis and prostatic abscess.      OPERATIVE NOTE:  Informed consent was obtained from the patient.  He was brought to the operating room, given endotracheal anesthesia, placed in lithotomy position.  Sterile prep and drape were applied and surgical timeout was taken.  A well lubricated 28-Italian resectoscope was inserted per urethra into the bladder without difficulty.  The obturator was removed and the working element was placed.  Inspection revealed ureteral orifices well away from the median lobe of the prostate which was extruding up into the bladder somewhat.  Median lobe was resected first down to circular bladder muscle fibers and then the floor of the prostatic fossa was resected out to and just lateral to the verumontanum.  Lateral lobes were resected in a stepwise fashion from 12 o'clock to 6 o'clock position.  On the left side during continued resection of the floor of the prostatic fossa, the abscess was unroofed.  Resection was continued down to the base of this to the surgical \"capsule\" of the prostate.  Arterial bleeders were cauterized as they were encountered.  Tissue chips were irrigated from the bladder and final inspection revealed no evidence of ongoing arterial bleeding.  Eden knife was then used to make a 6 o'clock incision in the high bladder neck down to perivesical fat and this sprung open the bladder neck nicely.  Final inspection done again, no evidence of ongoing arterial bleeding or residual tissue chips in the bladder, resection near the ureteral orifices or beyond the verumontanum.  Scope was removed and a 24-Italian three-way catheter was placed, 50 mL sterile water placed in " the balloon and this was placed to continuous irrigation and drainage.  He tolerated the procedure well and there were no complications.  Approximately 25 grams of tissue were resected and sent for pathology.      ESTIMATED BLOOD LOSS:  50 mL.      He will be outpatient in a bed overnight with continuous irrigation.  If that can be weaned tomorrow, he can be discharged home with a Goode catheter.  The catheter should stay in until .  He will follow up in office on that day for postop check and catheter removal.         DUKE ANDERSON MD             D: 2019   T: 2019   MT: EVGENY      Name:     DILLON DEY   MRN:      [id number]        Account:        [id number]   :      1945           Procedure Date: 2019      Document: [id number]       cc: Duke Anderson MD

## 2019-04-24 NOTE — ANESTHESIA PREPROCEDURE EVALUATION
Anesthesia Pre-Procedure Evaluation    Patient: Adrián Richardson   MRN: [id number] : 1945          Preoperative Diagnosis: BENIGN PROSTATIC HYPERPLASIA    Procedure(s):  CYSTOSCOPY, TRANSURETHRAL RESECTION (TUR) PROSTATE    Past Medical History:   Diagnosis Date     BPH (benign prostatic hyperplasia)      DDD (degenerative disc disease), lumbar      Diverticulosis      GERD (gastroesophageal reflux disease)      Hiatal hernia      Hyperlipidemia      Hypertrophy of prostate      Hypothyroidism      Psoriasis      WPW (Caroline-Parkinson-White syndrome)      Past Surgical History:   Procedure Laterality Date     ENT SURGERY      nasal sinus surgery     EYE SURGERY      cataract removal     HERNIA REPAIR      bilat inguinal hernia     ORTHOPEDIC SURGERY      hand carpectomy, knee arthroscopy       Anesthesia Evaluation     . Pt has had prior anesthetic. Type: General    No history of anesthetic complications          ROS/MED HX    ENT/Pulmonary:      (-) tobacco use, asthma and COPD   Neurologic:      (-) CVA, TIA and Neuropathy   Cardiovascular: Comment: WPW in 20s, states not seen since then, cardiology eval with no necessary treatment, asymptomatic    (+) Dyslipidemia, ----. : . . . :. .      (-) hypertension, CAD, irregular heartbeat/palpitations and stent   METS/Exercise Tolerance:  >4 METS   Hematologic:        (-) anemia   Musculoskeletal:         GI/Hepatic:     (+) GERD hiatal hernia,      (-) liver disease   Renal/Genitourinary:      (-) renal disease   Endo:     (+) thyroid problem hypothyroidism, .   (-) Type I DM and Type II DM   Psychiatric:         Infectious Disease:  - neg infectious disease ROS       Malignancy:         Other:                          Physical Exam  Normal systems: cardiovascular, pulmonary and dental    Airway   Mallampati: II  TM distance: >3 FB  Neck ROM: full    Dental     Cardiovascular   Rhythm and rate: regular and normal      Pulmonary    breath sounds clear to  "auscultation            Lab Results   Component Value Date    WBC 6.2 03/16/2019    HGB 14.9 03/16/2019    HCT 42.8 03/16/2019     03/16/2019     03/16/2019    POTASSIUM 3.8 03/16/2019    CHLORIDE 110 (H) 03/16/2019    CO2 26 03/16/2019    BUN 17 03/16/2019    CR 1.13 03/16/2019     (H) 03/16/2019    MO 8.6 03/16/2019    ALBUMIN 3.9 03/16/2019    PROTTOTAL 7.2 03/16/2019    ALT 43 03/16/2019    AST 30 03/16/2019    ALKPHOS 62 03/16/2019    BILITOTAL 0.7 03/16/2019       Preop Vitals  BP Readings from Last 3 Encounters:   04/24/19 152/90   03/16/19 (!) 144/103    Pulse Readings from Last 3 Encounters:   04/24/19 83   03/16/19 100      Resp Readings from Last 3 Encounters:   04/24/19 18   03/16/19 16    SpO2 Readings from Last 3 Encounters:   04/24/19 95%   03/16/19 96%      Temp Readings from Last 1 Encounters:   04/24/19 35.9  C (96.7  F) (Oral)    Ht Readings from Last 1 Encounters:   04/24/19 1.854 m (6' 1\")      Wt Readings from Last 1 Encounters:   04/24/19 97.7 kg (215 lb 4.8 oz)    Estimated body mass index is 28.41 kg/m  as calculated from the following:    Height as of this encounter: 1.854 m (6' 1\").    Weight as of this encounter: 97.7 kg (215 lb 4.8 oz).       Anesthesia Plan      History & Physical Review  History and physical reviewed and following examination; no interval change.    ASA Status:  2 .        Plan for General and ETT with Intravenous induction. Maintenance will be Balanced.    PONV prophylaxis:  Ondansetron (or other 5HT-3)       Postoperative Care  Postoperative pain management:  Oral pain medications.      Consents  Anesthetic plan, risks, benefits and alternatives discussed with:  Patient..                 Yury Pressley MD  "

## 2019-04-24 NOTE — OP NOTE
[facility] Brief Operative Note    Pre-operative diagnosis: BENIGN PROSTATIC HYPERPLASIA, chronic prostatitis, prostatic abscess   Post-operative diagnosis: Same   Procedure: Procedure(s):  CYSTOSCOPY, TRANSURETHRAL RESECTION (TUR) PROSTATE   Surgeon: Duke Whittaker MD, MD   Assistant(s): none   Anesthesia: General endotracheal anesthesia   Estimated blood loss: Less than 50 ml   Total IV fluids: (See anesthesia record)   Blood transfusion: No transfusion was given during surgery   Total urine output: Not measured   Drains: Goode catheter   Specimens: Prostate tissue   Implants: None   Findings: See dictation.   Complications: None   Condition: Stable   Comments: See dictated operative report for full details.

## 2019-04-25 VITALS
DIASTOLIC BLOOD PRESSURE: 75 MMHG | TEMPERATURE: 96.9 F | HEIGHT: 73 IN | RESPIRATION RATE: 16 BRPM | WEIGHT: 215.3 LBS | SYSTOLIC BLOOD PRESSURE: 113 MMHG | HEART RATE: 68 BPM | BODY MASS INDEX: 28.53 KG/M2 | OXYGEN SATURATION: 94 %

## 2019-04-25 LAB — COPATH REPORT: NORMAL

## 2019-04-25 PROCEDURE — 25800025 ZZH RX 258: Performed by: UROLOGY

## 2019-04-25 PROCEDURE — 25000132 ZZH RX MED GY IP 250 OP 250 PS 637: Performed by: UROLOGY

## 2019-04-25 PROCEDURE — A9270 NON-COVERED ITEM OR SERVICE: HCPCS | Performed by: UROLOGY

## 2019-04-25 RX ORDER — OXYCODONE HYDROCHLORIDE 5 MG/1
5 TABLET ORAL EVERY 6 HOURS PRN
Qty: 10 TABLET | Refills: 0 | Status: SHIPPED | OUTPATIENT
Start: 2019-04-25

## 2019-04-25 RX ORDER — CEPHALEXIN 500 MG/1
500 CAPSULE ORAL 2 TIMES DAILY
Qty: 10 CAPSULE | Refills: 0 | Status: SHIPPED | OUTPATIENT
Start: 2019-04-25 | End: 2019-04-30

## 2019-04-25 RX ADMIN — PREGABALIN 75 MG: 75 CAPSULE ORAL at 08:32

## 2019-04-25 RX ADMIN — LEVOTHYROXINE SODIUM 100 MCG: 100 TABLET ORAL at 06:15

## 2019-04-25 RX ADMIN — SODIUM CHLORIDE 6000 ML: 900 IRRIGANT IRRIGATION at 07:33

## 2019-04-25 NOTE — PLAN OF CARE
A&Ox4. VSS. RA. Lungs clear. Bowel sounds active, +flatus. Continous bladder irrigation- attempted to wean down to slow rate, output changed to watermelon color, increased to back up to moderate rate.Tolerating regular diet. C/O discomfort with justice-gave prn oxycodone x1-effective. Ambulated during the evening and in the AM-SBA. Plan for possible discharge today with justice in place. Continue to monitor.

## 2019-04-25 NOTE — PROGRESS NOTES
[facility]    Urology Progress Note     Assessment & Plan   Adrián Richardson is a 74 year old male who was admitted on 4/24/2019. POD #1 s/p TURP with Dr. Whittaker-- doing well     Plan:   -CBI clamped by myself this AM-- would not restart unless significant issues with justice patency and clots   -Ambulate   -Regular diet-- advise soft diet x 2wks     Plan for discharge home this afternoon if able to stay off CBI   Follow-up on 5/3 for catheter removal     Irasema Draper PA-C  [facility]   [email or web address]  Text Page (7:30am to 4:30pm)    Interval History   No issues overnight   Minimal to no pain   Ambulating well   CBI stopped this AM, urine blood tinged     AVSS    Physical Exam   Temp: 96.9  F (36.1  C) Temp src: Oral BP: 113/75 Pulse: 68 Heart Rate: 67 Resp: 16 SpO2: 94 % O2 Device: None (Room air) Oxygen Delivery: 3 LPM  Vitals:    04/24/19 0610   Weight: 97.7 kg (215 lb 4.8 oz)     Vital Signs with Ranges  Temp:  [96.7  F (35.9  C)-97.2  F (36.2  C)] 96.9  F (36.1  C)  Pulse:  [68] 68  Heart Rate:  [67-75] 67  Resp:  [11-16] 16  BP: (107-120)/(52-75) 113/75  SpO2:  [92 %-95 %] 94 %  I/O last 3 completed shifts:  In: 3108.33 [P.O.:1060; I.V.:2048.33]  Out: 85365 [Urine:67302; Blood:50]    Sitting up in chair, NAD; daughter at bedside   Breathing unlabored  abd soft, NT, ND  Justice with blood tinged UOP to slow CBI- now clamped   A&Ox3    Medications     sodium chloride 0.9% (bag)         atorvastatin  40 mg Oral Daily     dutasteride  0.5 mg Oral Daily     levothyroxine  100 mcg Oral Daily     pregabalin  75 mg Oral BID     sodium chloride (PF)  3 mL Intracatheter Q8H     tamsulosin  0.4 mg Oral Q24H       Data   No results found for this or any previous visit (from the past 24 hour(s)).

## 2019-05-01 ENCOUNTER — DOCUMENTATION ONLY (OUTPATIENT)
Dept: OTHER | Facility: CLINIC | Age: 74
End: 2019-05-01

## 2019-09-16 ENCOUNTER — APPOINTMENT (OUTPATIENT)
Dept: CT IMAGING | Facility: CLINIC | Age: 74
End: 2019-09-16
Attending: EMERGENCY MEDICINE
Payer: MEDICARE

## 2019-09-16 ENCOUNTER — HOSPITAL ENCOUNTER (EMERGENCY)
Facility: CLINIC | Age: 74
Discharge: HOME | End: 2019-09-16
Attending: EMERGENCY MEDICINE | Admitting: EMERGENCY MEDICINE
Payer: MEDICARE

## 2019-09-16 VITALS
SYSTOLIC BLOOD PRESSURE: 115 MMHG | WEIGHT: 202 LBS | TEMPERATURE: 98.3 F | HEIGHT: 73 IN | DIASTOLIC BLOOD PRESSURE: 71 MMHG | HEART RATE: 70 BPM | RESPIRATION RATE: 16 BRPM | OXYGEN SATURATION: 97 % | BODY MASS INDEX: 26.77 KG/M2

## 2019-09-16 DIAGNOSIS — R10.2 PELVIC PAIN: ICD-10-CM

## 2019-09-16 LAB
ALBUMIN SERPL-MCNC: 3.8 G/DL (ref 3.4–5)
ALBUMIN UR-MCNC: NEGATIVE MG/DL
ALP SERPL-CCNC: 73 U/L (ref 40–150)
ALT SERPL W P-5'-P-CCNC: 36 U/L (ref 0–70)
ANION GAP SERPL CALCULATED.3IONS-SCNC: 6 MMOL/L (ref 3–14)
APPEARANCE UR: CLEAR
AST SERPL W P-5'-P-CCNC: 26 U/L (ref 0–45)
BASOPHILS # BLD AUTO: 0 10E9/L (ref 0–0.2)
BASOPHILS NFR BLD AUTO: 0.2 %
BILIRUB SERPL-MCNC: 0.7 MG/DL (ref 0.2–1.3)
BILIRUB UR QL STRIP: NEGATIVE
BUN SERPL-MCNC: 12 MG/DL (ref 7–30)
CALCIUM SERPL-MCNC: 9 MG/DL (ref 8.5–10.1)
CHLORIDE SERPL-SCNC: 109 MMOL/L (ref 94–109)
CO2 SERPL-SCNC: 24 MMOL/L (ref 20–32)
COLOR UR AUTO: YELLOW
CREAT SERPL-MCNC: 1.01 MG/DL (ref 0.66–1.25)
DIFFERENTIAL METHOD BLD: NORMAL
EOSINOPHIL # BLD AUTO: 0.1 10E9/L (ref 0–0.7)
EOSINOPHIL NFR BLD AUTO: 0.8 %
ERYTHROCYTE [DISTWIDTH] IN BLOOD BY AUTOMATED COUNT: 14.8 % (ref 10–15)
GFR SERPL CREATININE-BSD FRML MDRD: 72 ML/MIN/{1.73_M2}
GLUCOSE SERPL-MCNC: 109 MG/DL (ref 70–99)
GLUCOSE UR STRIP-MCNC: NEGATIVE MG/DL
HCT VFR BLD AUTO: 44.9 % (ref 40–53)
HGB BLD-MCNC: 15.6 G/DL (ref 13.3–17.7)
HGB UR QL STRIP: NEGATIVE
IMM GRANULOCYTES # BLD: 0 10E9/L (ref 0–0.4)
IMM GRANULOCYTES NFR BLD: 0.3 %
KETONES UR STRIP-MCNC: NEGATIVE MG/DL
LEUKOCYTE ESTERASE UR QL STRIP: NEGATIVE
LYMPHOCYTES # BLD AUTO: 1.9 10E9/L (ref 0.8–5.3)
LYMPHOCYTES NFR BLD AUTO: 29.7 %
MCH RBC QN AUTO: 31.6 PG (ref 26.5–33)
MCHC RBC AUTO-ENTMCNC: 34.7 G/DL (ref 31.5–36.5)
MCV RBC AUTO: 91 FL (ref 78–100)
MONOCYTES # BLD AUTO: 0.4 10E9/L (ref 0–1.3)
MONOCYTES NFR BLD AUTO: 6.8 %
NEUTROPHILS # BLD AUTO: 4 10E9/L (ref 1.6–8.3)
NEUTROPHILS NFR BLD AUTO: 62.2 %
NITRATE UR QL: NEGATIVE
NRBC # BLD AUTO: 0 10*3/UL
NRBC BLD AUTO-RTO: 0 /100
PH UR STRIP: 5.5 PH (ref 5–7)
PLATELET # BLD AUTO: 170 10E9/L (ref 150–450)
POTASSIUM SERPL-SCNC: 3.8 MMOL/L (ref 3.4–5.3)
PROT SERPL-MCNC: 7.3 G/DL (ref 6.8–8.8)
RBC # BLD AUTO: 4.93 10E12/L (ref 4.4–5.9)
SODIUM SERPL-SCNC: 139 MMOL/L (ref 133–144)
SOURCE: NORMAL
SP GR UR STRIP: 1.02 (ref 1–1.03)
UROBILINOGEN UR STRIP-MCNC: NORMAL MG/DL (ref 0–2)
WBC # BLD AUTO: 6.3 10E9/L (ref 4–11)

## 2019-09-16 PROCEDURE — 81003 URINALYSIS AUTO W/O SCOPE: CPT | Performed by: EMERGENCY MEDICINE

## 2019-09-16 PROCEDURE — 25000128 H RX IP 250 OP 636: Performed by: EMERGENCY MEDICINE

## 2019-09-16 PROCEDURE — 74177 CT ABD & PELVIS W/CONTRAST: CPT

## 2019-09-16 PROCEDURE — 80053 COMPREHEN METABOLIC PANEL: CPT | Performed by: EMERGENCY MEDICINE

## 2019-09-16 PROCEDURE — 99285 EMERGENCY DEPT VISIT HI MDM: CPT | Mod: 25

## 2019-09-16 PROCEDURE — 85025 COMPLETE CBC W/AUTO DIFF WBC: CPT | Performed by: EMERGENCY MEDICINE

## 2019-09-16 PROCEDURE — 25000125 ZZHC RX 250: Performed by: EMERGENCY MEDICINE

## 2019-09-16 RX ORDER — IOPAMIDOL 755 MG/ML
102 INJECTION, SOLUTION INTRAVASCULAR ONCE
Status: COMPLETED | OUTPATIENT
Start: 2019-09-16 | End: 2019-09-16

## 2019-09-16 RX ORDER — SULFAMETHOXAZOLE/TRIMETHOPRIM 800-160 MG
1 TABLET ORAL 2 TIMES DAILY
Qty: 20 TABLET | Refills: 0 | Status: SHIPPED | OUTPATIENT
Start: 2019-09-16 | End: 2019-09-26

## 2019-09-16 RX ADMIN — SODIUM CHLORIDE 71 ML: 9 INJECTION, SOLUTION INTRAVENOUS at 10:48

## 2019-09-16 RX ADMIN — IOPAMIDOL 102 ML: 755 INJECTION, SOLUTION INTRAVENOUS at 10:48

## 2019-09-16 ASSESSMENT — MIFFLIN-ST. JEOR: SCORE: 1705.15

## 2019-09-16 NOTE — ED PROVIDER NOTES
"  History     Chief Complaint:  Flank Pain    HPI   Adrián Richardson is a 74 year old male who presents with flank pain.  He has had episodes of flank pain in the past.  He also recently developed a pelvic/prostate abscess that had to be surgically drained.  He is concerned that he may have a kidney stone, diverticulitis or recurrence of his prostate abscess.  He had similar discomfort when he went to visit his primary care doctor; however, at that time laboratory work-up was performed normal.  He reports that he had a consistent with significant pain since last Friday that starts in the left side of his abdomen and radiates to his left leg.  He has not noticed any blood in his urine or dysuria.  He has not noticed any change in his bowel habits, burning with urination or other symptoms.  He does note that he has extensive diverticulosis noted on prior imaging tests.    Allergies:  Levofloxacin     Medications:    Bactrim  Atorvastatin  Dutasteride  Flonase  Levothyroxine  Pregabalin  Flomax    Past Medical History:    Benign prostatic hyperplasia  DDD, lumbar  Diverticulosis  GERD  Hiatal hernia  Hypertrophy of prostate  Hypothyroidism  Psoriasis  Caroline Parkinson White syndrome    Past Surgical History:    ENT surgery  Cataract removal  Hernia repair  Orthopedic surgery    Family History:    Colon cancer  Breast cancer    Social History:  Smoking status: Former smoker  Alcohol use: Yes  Drug use: No  PCP: Hannah Ulloa  Marital Status:        Review of Systems   All other systems reviewed and are negative.    Physical Exam     Patient Vitals for the past 24 hrs:   BP Temp Temp src Pulse Resp SpO2 Height Weight   09/16/19 0936 (!) 142/88 98.3  F (36.8  C) Oral 95 18 95 % 1.854 m (6' 1\") 91.6 kg (202 lb)       Physical Exam  Eyes:  The pupils are equal and round    Conjunctivae and sclerae are normal  ENT:    The nose is normal    Pinnae are normal  CV:  Regular rate and rhythm     No edema  Resp:  Lungs " are clear    Non-labored    No rales    No wheezing   GI:  Abdomen is soft with tenderness in the suprapubic area, there is no rigidity    No distension    No rebound tenderness   Rectal: Mild tenderness of the prostate  MS:  Normal muscular tone    No asymmetric leg swelling    Mild left flank tenderness  Skin:  No rash or acute skin lesions noted  Neuro:   Awake, alert.      Speech is normal and fluent.    Face is symmetric.     Moves all extremities       Emergency Department Course   Imaging:  Radiographic findings were communicated with the patient who voiced understanding of the findings.    CT Abdomen Pelvis w Contrast  1. Extensive pancolonic diverticulosis. No convincing evidence of  diverticulitis.  2. No evidence of prostate abscess or pelvic fluid collection.  3. Nonspecific bladder wall thickening with diverticula.  As read by Radiology.    Laboratory:  UA reflex to Microscopic and Culture: All fields negative    CBC: WNL (WBC 6.3, HGB 15.6, )  CMP: Glucose 109 (H) o/w WNL (Creatinine 1.01)    Emergency Department Course:  Past medical records, nursing notes, and vitals reviewed.  I performed an exam of the patient and obtained history, as documented above.    UA obtained, findings above.    IV inserted and blood drawn.    Patient discharged home with instructions regarding supportive care, medications, and reasons to return. The importance of close follow-up was reviewed.     Impression & Plan    Medical Decision Making:  Adrián Richardson is a 74 year old male who presents the emergency department with lower abdominal and flank pain.  He had the symptoms before.  One time he was seen by his urologist and was started on medications for possible diverticulitis.  He recently followed up with his primary care provider who did labs during 1 of these episodes which were normal.  Since last Friday he is developed consistent pain.  Given location he was concerned about kidney stones, diverticulitis, or  possible recurrence of his prostate infection or prostatitis.  On exam he does have some mild.  His exam is otherwise fairly reassuring.  His abdomen is nonsurgical appearing.  CT scan was performed due to the duration of his pain and concern for the above diagnoses.  CT scan returned quite normal as did all of his blood work and urinalysis.  After prolonged discussion, antibiotics were prescribed for possible early prostatitis.  I explained to him that there is no clear indication for prostatitis on his work-up today however given the tenderness of his prostate would be reasonable to try a course of antibiotics.  He was given Bactrim which is tolerated well in the past.  He is advised to follow-up with his primary care doctor and his urologist.  He should return to the emergency department if his symptoms worsen or change.    Diagnosis:    ICD-10-CM   1. Pelvic pain R10.2       Disposition: Discharged to home    Discharge Medications:  New Prescriptions    SULFAMETHOXAZOLE-TRIMETHOPRIM (BACTRIM DS) 800-160 MG TABLET    Take 1 tablet by mouth 2 times daily for 10 days       Kimberly Douglas  9/16/2019    EMERGENCY DEPARTMENT       Angel Mae MD  09/16/19 9854

## 2019-09-16 NOTE — ED AVS SNAPSHOT
Emergency Department  [address] [place] [address]  Phone:  [phone]  Fax:  [phone]                                    Adrián Richardson   MRN: [id number]    Department:   Emergency Department   Date of Visit:  9/16/2019           After Visit Summary Signature Page    I have received my discharge instructions, and my questions have been answered. I have discussed any challenges I see with this plan with the nurse or doctor.    ..........................................................................................................................................  Patient/Patient Representative Signature      ..........................................................................................................................................  Patient Representative Print Name and Relationship to Patient    ..................................................               ................................................  Date                                   Time    ..........................................................................................................................................  Reviewed by Signature/Title    ...................................................              ..............................................  Date                                               Time          22EPIC Rev 08/18

## (undated) DEVICE — PACK TUR CUSTOM SBA15RUFSE

## (undated) DEVICE — CABLE HIGH FREQEUCY STERILE 8MM PLUG 300CM 277KB-D/10

## (undated) DEVICE — BAG URINARY DRAIN 4000ML LF 153509

## (undated) DEVICE — DECANTER BAG 2002S

## (undated) DEVICE — SOL GLYCINE 1.5% 3000ML BAG 2B7317

## (undated) DEVICE — SOL WATER IRRIG 1000ML BOTTLE 2F7114

## (undated) DEVICE — BAG CYSTO TABLE DRAIN

## (undated) DEVICE — GLOVE PROTEXIS W/NEU-THERA 8.0  2D73TE80

## (undated) DEVICE — ESU ELEC LOOP 27FR 27050F

## (undated) DEVICE — CATH FOLEY 3WAY 24FR 30ML LATEX 0167SI24

## (undated) DEVICE — ESU GROUND PAD UNIVERSAL W/O CORD

## (undated) DEVICE — PAD CHUX UNDERPAD 23X24" 7136

## (undated) DEVICE — ESU ELEC COAGULATE 27FR POINTED 27050K

## (undated) RX ORDER — NEOSTIGMINE METHYLSULFATE 1 MG/ML
VIAL (ML) INJECTION
Status: DISPENSED
Start: 2019-04-24

## (undated) RX ORDER — LIDOCAINE HYDROCHLORIDE 20 MG/ML
JELLY TOPICAL
Status: DISPENSED
Start: 2019-04-24

## (undated) RX ORDER — DEXAMETHASONE SODIUM PHOSPHATE 4 MG/ML
INJECTION, SOLUTION INTRA-ARTICULAR; INTRALESIONAL; INTRAMUSCULAR; INTRAVENOUS; SOFT TISSUE
Status: DISPENSED
Start: 2019-04-24

## (undated) RX ORDER — PROPOFOL 10 MG/ML
INJECTION, EMULSION INTRAVENOUS
Status: DISPENSED
Start: 2019-04-24

## (undated) RX ORDER — ATROPA BELLADONNA AND OPIUM 16.2; 3 MG/1; MG/1
SUPPOSITORY RECTAL
Status: DISPENSED
Start: 2019-04-24

## (undated) RX ORDER — ONDANSETRON 2 MG/ML
INJECTION INTRAMUSCULAR; INTRAVENOUS
Status: DISPENSED
Start: 2019-04-24

## (undated) RX ORDER — GLYCOPYRROLATE 0.2 MG/ML
INJECTION, SOLUTION INTRAMUSCULAR; INTRAVENOUS
Status: DISPENSED
Start: 2019-04-24

## (undated) RX ORDER — LIDOCAINE HYDROCHLORIDE 20 MG/ML
INJECTION, SOLUTION EPIDURAL; INFILTRATION; INTRACAUDAL; PERINEURAL
Status: DISPENSED
Start: 2019-04-24

## (undated) RX ORDER — VECURONIUM BROMIDE 1 MG/ML
INJECTION, POWDER, LYOPHILIZED, FOR SOLUTION INTRAVENOUS
Status: DISPENSED
Start: 2019-04-24

## (undated) RX ORDER — CEFAZOLIN SODIUM 2 G/100ML
INJECTION, SOLUTION INTRAVENOUS
Status: DISPENSED
Start: 2019-04-24

## (undated) RX ORDER — FENTANYL CITRATE 50 UG/ML
INJECTION, SOLUTION INTRAMUSCULAR; INTRAVENOUS
Status: DISPENSED
Start: 2019-04-24

## (undated) RX ORDER — HYDROMORPHONE HYDROCHLORIDE 1 MG/ML
INJECTION, SOLUTION INTRAMUSCULAR; INTRAVENOUS; SUBCUTANEOUS
Status: DISPENSED
Start: 2019-04-24